# Patient Record
Sex: FEMALE | Race: BLACK OR AFRICAN AMERICAN | NOT HISPANIC OR LATINO
[De-identification: names, ages, dates, MRNs, and addresses within clinical notes are randomized per-mention and may not be internally consistent; named-entity substitution may affect disease eponyms.]

---

## 2023-03-29 ENCOUNTER — NON-APPOINTMENT (OUTPATIENT)
Age: 38
End: 2023-03-29

## 2023-03-29 PROBLEM — Z00.00 ENCOUNTER FOR PREVENTIVE HEALTH EXAMINATION: Status: ACTIVE | Noted: 2023-03-29

## 2023-03-31 ENCOUNTER — NON-APPOINTMENT (OUTPATIENT)
Age: 38
End: 2023-03-31

## 2023-04-17 ENCOUNTER — APPOINTMENT (OUTPATIENT)
Dept: HEMATOLOGY ONCOLOGY | Facility: CLINIC | Age: 38
End: 2023-04-17

## 2023-04-17 ENCOUNTER — APPOINTMENT (OUTPATIENT)
Dept: BREAST CENTER | Facility: CLINIC | Age: 38
End: 2023-04-17
Payer: COMMERCIAL

## 2023-04-17 ENCOUNTER — LABORATORY RESULT (OUTPATIENT)
Age: 38
End: 2023-04-17

## 2023-04-17 ENCOUNTER — NON-APPOINTMENT (OUTPATIENT)
Age: 38
End: 2023-04-17

## 2023-04-17 VITALS
BODY MASS INDEX: 36.8 KG/M2 | SYSTOLIC BLOOD PRESSURE: 123 MMHG | DIASTOLIC BLOOD PRESSURE: 86 MMHG | HEIGHT: 62 IN | HEART RATE: 90 BPM | WEIGHT: 200 LBS

## 2023-04-17 DIAGNOSIS — Z78.9 OTHER SPECIFIED HEALTH STATUS: ICD-10-CM

## 2023-04-17 DIAGNOSIS — N64.52 NIPPLE DISCHARGE: ICD-10-CM

## 2023-04-17 DIAGNOSIS — Z91.89 OTHER SPECIFIED PERSONAL RISK FACTORS, NOT ELSEWHERE CLASSIFIED: ICD-10-CM

## 2023-04-17 DIAGNOSIS — Z80.3 FAMILY HISTORY OF MALIGNANT NEOPLASM OF BREAST: ICD-10-CM

## 2023-04-17 DIAGNOSIS — Z87.09 PERSONAL HISTORY OF OTHER DISEASES OF THE RESPIRATORY SYSTEM: ICD-10-CM

## 2023-04-17 DIAGNOSIS — N64.4 MASTODYNIA: ICD-10-CM

## 2023-04-17 DIAGNOSIS — Z12.39 ENCOUNTER FOR OTHER SCREENING FOR MALIGNANT NEOPLASM OF BREAST: ICD-10-CM

## 2023-04-17 PROCEDURE — 99204 OFFICE O/P NEW MOD 45 MIN: CPT

## 2023-04-17 RX ORDER — EMTRICITABINE, RILPIVIRINE HYDROCHLORIDE, AND TENOFOVIR ALAFENAMIDE 200; 25; 25 MG/1; MG/1; MG/1
TABLET ORAL
Refills: 0 | Status: ACTIVE | COMMUNITY

## 2023-04-17 NOTE — ASSESSMENT
[FreeTextEntry1] : 38-year-old female with left breast spontaneous clear nipple discharge as well as family history of breast cancer

## 2023-04-17 NOTE — DISCUSSION/SUMMARY
[FreeTextEntry1] : REASON FOR CONSULT Carlos Isbell is a 38-year-old female referred by Dr. Jillian Lew for cancer genetic counseling and risk assessment due to a family history of breast cancer. Ms. Isbell was seen on 2023, at which time medical and family history was ascertained and a pedigree constructed.   RELEVANT MEDICAL HISTORY Ms. Isbell is a healthy individual with no reported history of cancer. She has a family history of cancer, see below.  OTHER MEDICAL AND SURGICAL HISTORY: •	Medical History: No major medical history reported •	Surgical History: abdominoplasty  OB/GYN HISTORY: Obstetrical History:  Age at Menarche: 12 Menopausal Status: Premenopausal  Age at First Live Birth: 32 Oral Contraceptive Use: No Hormone Replacement Therapy: No  CANCER SCREENING HISTORY:   Breast:  •	Mammography: last 2023, normal  •	Sonography: last 2023, normal •	MRI: No •	Biopsies: No  GYN: •	Pelvic Examination: last 2023, reportedly normal •	Sonography: Patient reports undergoing a TVUS once due to abnormal PAP smear results. She reports that results were normal. •	CA-125: No Colon: •	Colonoscopy: No •	Upper Endoscopy: No  •	FOBT: No Skin:   •	FBSE: last 2023, reportedly normal •	Lesions biopsied/removed: No  SOCIAL HISTORY: •	Tobacco-product use: No •	Environmental exposures: No  FAMILY HISTORY: Maternal ancestry and paternal ancestry were reported as [place]ic. Ashkenazi Voodoo ancestry and consanguinity were denied. A detailed family history of cancer was ascertained, see below and scanned chart for pedigree.   Of note, Ms. Isbell reports that she does not have access to the medical history for many of her paternal relatives.   According to Ms. Isbell, no one in the family has had germline testing for cancer susceptibility.  	 	RISK ASSESSMENT: Ms. Isbell’s family history is suggestive of a hereditary cancer syndrome given her mother’s breast cancer diagnosis at 40 years old. The patient meets National Comprehensive Cancer Network (NCCN) criteria for genetic testing. We recommended genetic testing for genes associated with breast cancer and gynecological cancers. This test analyzes 19 genes: YOANDY, BARD1, BRCA1, BRCA2, BRIP1, CDH1, CHEK2, EPCAM, MLH1, MSH2, MSH6, NF1, PALB2, PMS2, PTEN, RAD51C, RAD51D, STK11, and TP53.  The risks, benefits and limitations of genetic testing were discussed with Ms. Isbell. In addition, we discussed the purpose of genetic testing and possible test results (positive, negative, inconclusive) along with associated medical management options and psychosocial implications. Insurance coverage and potential out of pocket costs were also discussed. The Genetic Information Non-discrimination Act (ZANA) was reviewed, and she was provided with written information regarding ZANA.   It was explained that risk assessment is based upon medical and family history as provided and may change in the future should new information be obtained.   Following our discussion, Ms. Isbell consented to the above-mentioned genetic testing panel. Blood was drawn in our laboratory and sent to [facility] today.  PLAN:  1.	Blood drawn today will be sent to [facility] for analysis.  2.	We will contact Ms. Isbell to schedule a follow-up appointment once the results are available. Results generally return in 2-3 weeks.   For any additional questions please call Cancer Genetics at [phone].   Stefanie Puente MS, [facility] [place] Genetic Counselor, Cancer Genetics

## 2023-04-17 NOTE — PAST MEDICAL HISTORY
[Menstruating] : The patient is menstruating [Menarche Age ____] : age at menarche was [unfilled] [Definite ___ (Date)] : the last menstrual period was [unfilled] [Regular Cycle Intervals] : have been regular [Total Preg ___] : G[unfilled] [Live Births ___] : P[unfilled]  [Age At Live Birth ___] : Age at live birth: [unfilled] [History of Hormone Replacement Treatment] : has no history of hormone replacement treatment [FreeTextEntry2] : 1 miscarriage [FreeTextEntry6] : no [FreeTextEntry7] : no [FreeTextEntry8] : no

## 2023-04-17 NOTE — HISTORY OF PRESENT ILLNESS
[FreeTextEntry1] : Carlos is a 38 year old female, referred by Dr. Mariella Cabrera (OB/GYN), who presents for initial evaluation regarding spontaneous clear left nipple discharge and non-spontaneous right clear nipple discharge onset a few months ago. Pt denies palpable masses, skin lesions/changes or other breast complaints.  The patient noticed staining on her clothing from the left nipple many months ago.  She had noticed it on a few occasions.  The nipple discharge was clear in nature and was not bloody.  On manual compression she was able to express clear nipple discharge from both nipples.  She also saw her GYN who was also able to replicate that.  She was previously ordered for a prolactin level but did not complete it.  We will draw that today.  I recommended bilateral breast MRI to further evaluate both breasts in addition to her family history.  Due to her family history of breast cancer in her mother at a young age of 40 I recommended genetic counseling for possible genetic testing.  The patient is amenable and agrees.  I encouraged the patient not to stimulate her nipples in any way from a personal standpoint or sexually.  I did recommend that she keep close observation for any additional spontaneous nipple discharge.  She did state that she has not noticed any new nipple discharge for many months.  Of note, the patient is interested in bilateral breast reduction for which I will refer her to plastic surgery.\par \par JOSÉ lifetime risk of 20.2% the patients mother was diagnosed with breast cancer at age 40; knowledge of the full family history is limited, but she does not think that her mother had genetic testing. \par \par \par

## 2023-04-17 NOTE — PHYSICAL EXAM
[Normocephalic] : normocephalic [EOMI] : extra ocular movement intact [Supple] : supple [Examined in the supine and seated position] : examined in the supine and seated position [Symmetrical] : symmetrical [No dominant masses] : no dominant masses in right breast  [No dominant masses] : no dominant masses left breast [No Nipple Retraction] : no left nipple retraction [No Nipple Discharge] : no left nipple discharge [No Axillary Lymphadenopathy] : no left axillary lymphadenopathy [No Rashes] : no rashes [de-identified] : No nipple discharge able to be expressed even on manual compression bilaterally

## 2023-04-17 NOTE — DATA REVIEWED
[FreeTextEntry1] : 3/17/23 ([facility]) b/l dx mammogram/US: scattered fibroglandular, no mammographic or sonographic evidence of malignancy. Clincal correlation recommended. Negative BIRADS 1 \par

## 2023-04-18 ENCOUNTER — NON-APPOINTMENT (OUTPATIENT)
Age: 38
End: 2023-04-18

## 2023-04-24 ENCOUNTER — NON-APPOINTMENT (OUTPATIENT)
Age: 38
End: 2023-04-24

## 2023-05-01 ENCOUNTER — NON-APPOINTMENT (OUTPATIENT)
Age: 38
End: 2023-05-01

## 2023-05-01 NOTE — DISCUSSION/SUMMARY
[FreeTextEntry1] : REASON FOR CONSULT\par Carlos Isbell is a 38-year-old female who was contacted on 05/01/2023 for a discussion regarding her negative genetic testing results related to hereditary cancer predisposition. This session was conducted via telephone.  \par \par Ms. Isbell was originally seen by the Cancer Genetics Service on 04/17/2023 for hereditary cancer predisposition risk assessment due to a family history of breast cancer. At that time, Ms. Isbell decided to pursue genetic testing for genes associated with breast cancer and gynecological cancers offered by [facility].\par \par TEST RESULTS: NEGATIVE\par NO pathogenic (disease-causing) variants or variants of uncertain significance were detected in any of the following genes [19]:  YOANDY, BARD1, BRCA1, BRCA2, BRIP1, CDH1, CHEK2, EPCAM, MLH1, MSH2, MSH6, NF1, PALB2, PMS2, PTEN, RAD51C, RAD51D, STK11, and TP53.\par \par RESULTS INTERPRETATION AND ASSESSMENT:\par Given Ms. Isbell’s personal medical history and current reported family history of cancer, and her negative genetic test results, the following screening guidelines and risk-reducing recommendations were discussed:\par \par OTHER:\par •	In the absence of other indications, Ms. Isbell should practice age-appropriate cancer screening of other organ systems as recommended for the general population.\par \par We also discussed the limitations of negative results:\par 1.	The cause of Ms. Isbell’s family history of cancer remains unknown. The cancer(s) may have developed randomly, or due to environmental factors.  \par 2.	This negative result does not completely rule out a hereditary basis for the reported personal and/or family history due to limitations in technology or a variant being present in an unidentified gene. \par 3.	Variants in other genes would not be identified by this analysis, so this negative result does not rule out the likelihood of having a mutation in a different hereditary cancer gene or the possibility of ever developing cancer.\par 4.	It is possible there is a hereditary cancer predisposition gene mutation in the family, but the patient did not inherit it. \par \par We informed Ms. Isbell that our knowledge of genetics and inherited cancer conditions is changing rapidly. Therefore, we recommended that Ms. Isbell contact our office, every 2 to 3 years, to discuss relevant advances in cancer genetics.  We emphasized the importance of re-contacting us with updates regarding her personal and family history of cancer as well as any updates regarding additional cancer genetic test results performed for the patient and/or family members.  Such updates could possibly change our risk assessment and recommendations. \par \par In addition, we discussed Ms. Isbell’s mother and siblings consider pursuing cancer risk assessment genetic counseling with the option of genetic testing. \par \par PLAN:\par 1.	These results do not change Ms. Isbell’s medical management. \par 2.	Patient informed consult note(s) will be available through their [facility] patient portal and genetic test results will be released via [facility]’s portal.\par 3.	Ms. Isbell was encouraged to contact us every 2-3 years to discuss relevant advances in cancer genetics, or sooner if there are any changes in her personal or family history of cancer.\par \par For any additional questions please call Cancer Genetics at [phone]. \par \par Stefanie Puente MS, [facility] [place]\par Genetic Counselor, Cancer Genetics\par \par

## 2023-05-18 ENCOUNTER — APPOINTMENT (OUTPATIENT)
Dept: OTOLARYNGOLOGY | Facility: CLINIC | Age: 38
End: 2023-05-18

## 2023-05-18 VITALS — HEIGHT: 62 IN | WEIGHT: 191 LBS | BODY MASS INDEX: 35.15 KG/M2

## 2023-07-07 ENCOUNTER — APPOINTMENT (OUTPATIENT)
Dept: PLASTIC SURGERY | Facility: CLINIC | Age: 38
End: 2023-07-07

## 2023-08-21 ENCOUNTER — APPOINTMENT (OUTPATIENT)
Dept: BREAST CENTER | Facility: CLINIC | Age: 38
End: 2023-08-21

## 2025-09-16 ENCOUNTER — NON-APPOINTMENT (OUTPATIENT)
Age: 40
End: 2025-09-16